# Patient Record
Sex: MALE | Race: WHITE | NOT HISPANIC OR LATINO | Employment: OTHER | URBAN - METROPOLITAN AREA
[De-identification: names, ages, dates, MRNs, and addresses within clinical notes are randomized per-mention and may not be internally consistent; named-entity substitution may affect disease eponyms.]

---

## 2020-09-24 ENCOUNTER — HOSPITAL ENCOUNTER (OUTPATIENT)
Dept: RADIOLOGY | Facility: HOSPITAL | Age: 85
Discharge: HOME | End: 2020-09-24
Attending: FAMILY MEDICINE
Payer: MEDICARE

## 2020-09-24 DIAGNOSIS — R41.3 MEMORY LOSS OR IMPAIRMENT: ICD-10-CM

## 2020-09-24 DIAGNOSIS — R32 ENURESIS: ICD-10-CM

## 2020-09-24 PROCEDURE — 70551 MRI BRAIN STEM W/O DYE: CPT | Mod: TC,PO

## 2020-09-24 PROCEDURE — 70551 MRI BRAIN STEM W/O DYE: CPT | Mod: 26,,, | Performed by: RADIOLOGY

## 2020-09-24 PROCEDURE — 70551 MRI BRAIN WITHOUT CONTRAST: ICD-10-PCS | Mod: 26,,, | Performed by: RADIOLOGY

## 2020-11-05 PROBLEM — F32.A ANXIETY AND DEPRESSION: Status: ACTIVE | Noted: 2020-11-05

## 2020-11-05 PROBLEM — F41.9 ANXIETY AND DEPRESSION: Status: ACTIVE | Noted: 2020-11-05

## 2020-11-05 PROBLEM — R32 BOWEL AND BLADDER INCONTINENCE: Status: RESOLVED | Noted: 2020-11-05 | Resolved: 2020-11-05

## 2020-11-05 PROBLEM — R32 BOWEL AND BLADDER INCONTINENCE: Status: ACTIVE | Noted: 2020-11-05

## 2020-11-05 PROBLEM — R15.9 BOWEL AND BLADDER INCONTINENCE: Status: RESOLVED | Noted: 2020-11-05 | Resolved: 2020-11-05

## 2020-11-05 PROBLEM — R15.9 BOWEL AND BLADDER INCONTINENCE: Status: ACTIVE | Noted: 2020-11-05

## 2020-11-05 PROBLEM — F02.80 LATE ONSET ALZHEIMER'S DISEASE WITHOUT BEHAVIORAL DISTURBANCE: Status: ACTIVE | Noted: 2020-11-05

## 2020-11-05 PROBLEM — N39.46 MIXED URGE AND STRESS INCONTINENCE: Status: ACTIVE | Noted: 2020-11-05

## 2020-11-05 PROBLEM — G30.1 LATE ONSET ALZHEIMER'S DISEASE WITHOUT BEHAVIORAL DISTURBANCE: Status: ACTIVE | Noted: 2020-11-05

## 2020-11-18 ENCOUNTER — TELEPHONE (OUTPATIENT)
Dept: UROLOGY | Facility: CLINIC | Age: 85
End: 2020-11-18

## 2020-11-18 NOTE — TELEPHONE ENCOUNTER
----- Message from Amber Brennan sent at 11/18/2020  4:17 PM CST -----  Regarding: sooner appt  Contact: [phone]  Type:  Sooner Apoointment Request    Caller is requesting a sooner appointment.  Caller declined first available appointment listed below.  Caller will not accept being placed on the waitlist and is requesting a message be sent to doctor.  Name of Caller: daughter  When is the first available appointment? 12/14/20  Symptoms:can not use restroom   Would the patient rather a call back or a response via [email or web address]? Call back   Best Call Back Number:[phone]  Additional Information:

## 2020-11-19 ENCOUNTER — TELEPHONE (OUTPATIENT)
Dept: UROLOGY | Facility: CLINIC | Age: 85
End: 2020-11-19

## 2020-11-19 NOTE — TELEPHONE ENCOUNTER
----- Message from Sanam Sood sent at 11/19/2020  9:10 AM CST -----  Contact: daughter  Type:  Patient Returning Call    Who Called:  daughter- sy  Who Left Message for Patient:  Summer oCrrales  Does the patient know what this is regarding?:  yes  Best Call Back Number:  [phone] (home)

## 2020-11-23 ENCOUNTER — HOSPITAL ENCOUNTER (OUTPATIENT)
Dept: RADIOLOGY | Facility: HOSPITAL | Age: 85
Discharge: HOME | End: 2020-11-23
Attending: UROLOGY
Payer: MEDICARE

## 2020-11-23 ENCOUNTER — OFFICE VISIT (OUTPATIENT)
Dept: UROLOGY | Facility: CLINIC | Age: 85
End: 2020-11-23
Payer: MEDICARE

## 2020-11-23 VITALS — WEIGHT: 156.06 LBS | HEIGHT: 67 IN | BODY MASS INDEX: 24.49 KG/M2

## 2020-11-23 DIAGNOSIS — Z87.442 HISTORY OF KIDNEY STONES: Primary | ICD-10-CM

## 2020-11-23 DIAGNOSIS — R31.9 HEMATURIA, UNSPECIFIED TYPE: ICD-10-CM

## 2020-11-23 DIAGNOSIS — R39.15 URINARY URGENCY: ICD-10-CM

## 2020-11-23 DIAGNOSIS — N39.41 URGE INCONTINENCE: ICD-10-CM

## 2020-11-23 DIAGNOSIS — R35.0 INCREASED URINARY FREQUENCY: ICD-10-CM

## 2020-11-23 DIAGNOSIS — R31.29 MICROSCOPIC HEMATURIA: ICD-10-CM

## 2020-11-23 LAB
BILIRUB SERPL-MCNC: ABNORMAL MG/DL
BLOOD URINE, POC: ABNORMAL
CLARITY, POC UA: CLEAR
COLOR, POC UA: YELLOW
GLUCOSE UR QL STRIP: ABNORMAL
KETONES UR QL STRIP: ABNORMAL
LEUKOCYTE ESTERASE URINE, POC: ABNORMAL
NITRITE, POC UA: ABNORMAL
PH, POC UA: 5.5
PROTEIN, POC: ABNORMAL
SPECIFIC GRAVITY, POC UA: >=1.03
UROBILINOGEN, POC UA: 0.2

## 2020-11-23 PROCEDURE — 99204 OFFICE O/P NEW MOD 45 MIN: CPT | Mod: S$PBB,,, | Performed by: UROLOGY

## 2020-11-23 PROCEDURE — 99204 PR OFFICE/OUTPT VISIT, NEW, LEVL IV, 45-59 MIN: ICD-10-PCS | Mod: S$PBB,,, | Performed by: UROLOGY

## 2020-11-23 PROCEDURE — 99999 PR PBB SHADOW E&M-EST. PATIENT-LVL III: ICD-10-PCS | Mod: PBBFAC,,, | Performed by: UROLOGY

## 2020-11-23 PROCEDURE — 99999 PR PBB SHADOW E&M-EST. PATIENT-LVL III: CPT | Mod: PBBFAC,,, | Performed by: UROLOGY

## 2020-11-23 PROCEDURE — 81002 URINALYSIS NONAUTO W/O SCOPE: CPT | Mod: PBBFAC,PO | Performed by: UROLOGY

## 2020-11-23 PROCEDURE — 99213 OFFICE O/P EST LOW 20 MIN: CPT | Mod: PBBFAC,25,PO | Performed by: UROLOGY

## 2020-11-23 PROCEDURE — 74176 CT ABD & PELVIS W/O CONTRAST: CPT | Mod: 26,,, | Performed by: RADIOLOGY

## 2020-11-23 PROCEDURE — 74176 CT RENAL STONE STUDY ABD PELVIS WO: ICD-10-PCS | Mod: 26,,, | Performed by: RADIOLOGY

## 2020-11-23 PROCEDURE — 74176 CT ABD & PELVIS W/O CONTRAST: CPT | Mod: TC,PO

## 2020-11-23 RX ORDER — CITALOPRAM 20 MG/1
TABLET, FILM COATED ORAL
COMMUNITY
Start: 2020-11-17 | End: 2021-02-02 | Stop reason: SDUPTHER

## 2020-11-23 RX ORDER — MIRABEGRON 50 MG/1
1 TABLET, FILM COATED, EXTENDED RELEASE ORAL DAILY
Qty: 30 TABLET | Refills: 11 | Status: SHIPPED | OUTPATIENT
Start: 2020-11-23 | End: 2021-03-15 | Stop reason: ALTCHOICE

## 2020-11-23 NOTE — LETTER
November 23, 2020      Aniceto Lester MD  [address] [facility] [address]  [facility] [address]           [facility]  [address] [place] [address]  Phone: [phone]  Fax: [phone]          Patient: Jared Rivera   MR Number: [id number]   YOB: 1935   Date of Visit: 11/23/2020       Dear Dr. Aniceto Lester:    Thank you for referring Jared Rivera to me for evaluation. Attached you will find relevant portions of my assessment and plan of care.    If you have questions, please do not hesitate to call me. I look forward to following Jared Rivera along with you.    Sincerely,    GUNNAR Nelson MD    Enclosure  CC:  No Recipients    If you would like to receive this communication electronically, please contact [email or web address] or [phone] to request more information on [employer] Link access.    For providers and/or their staff who would like to refer a patient to [facility], please contact us through our one-stop-shop provider referral line, [facility], at [phone].    If you feel you have received this communication in error or would no longer like to receive these types of communications, please e-mail [email or web address]

## 2020-11-23 NOTE — PROGRESS NOTES
Subjective:       Patient ID: Jared Rivera is a 85 y.o. male.    Chief Complaint: Other (had issues urinating last tuesday)    HPI     85-year-old with a history of prostate cancer.  He underwent brachytherapy in 2004.  His last PSA is undetectable.  Recently he has been having problem with urinary frequency urgency and urgency incontinence.  He has a history of chronic constipation.  He has been treated with laxative in the past.  He denies hematuria and dysuria.  He is seen today with his daughter who gives a lot of the history.  He does have a history of an obstructing ureteral stone.  A CT scan about 10 months ago showed a large obstructing stone in the proximal right ureter.  He is not having any flank pain.  His urinalysis is clear today and he empties completely.  He was sent down for repeat CT scan.  I reviewed the images with him.  The stone is gone and has likely passed.  He did have a moderate amount of stool in the rectal vault but no significant stool in the colon.  Urine dipstick shows negative for all components except trace blood.  PVR 0 ml    Component PSA Diagnostic   Latest Ref Rng & Units 0.00 - 4.00 ng/mL   9/14/2020 <0.06       Past Medical History:   Diagnosis Date    DVT (deep venous thrombosis)     Hyperlipidemia     Kidney stones     Prostate cancer     prostate     Past Surgical History:   Procedure Laterality Date    HEMORRHOID SURGERY      prostate radiation  implants  2003    WISDOM TOOTH EXTRACTION         Current Outpatient Medications:     bisacodyL (DULCOLAX, BISACODYL,) 5 mg EC tablet, Take 1 tablet (5 mg total) by mouth daily as needed for Constipation., Disp: 30 tablet, Rfl: 1    citalopram (CELEXA) 20 MG tablet, TK 1 T PO QD, Disp: , Rfl:     donepeziL (ARICEPT) 5 MG tablet, Take 5 mg by mouth every evening., Disp: , Rfl:     polyethylene glycol (GLYCOLAX) 17 gram PwPk, Take 17 g by mouth once daily., Disp: , Rfl: 0    rivaroxaban (XARELTO) 20 mg Tab, Take 1  tablet (20 mg total) by mouth daily with dinner or evening meal., Disp: 30 tablet, Rfl: 0    mirabegron (MYRBETRIQ) 50 mg Tb24, Take 1 tablet (50 mg total) by mouth once daily., Disp: 30 tablet, Rfl: 11      Review of Systems   Constitutional: Negative for fever.   Eyes: Negative for visual disturbance.   Respiratory: Negative for shortness of breath.    Cardiovascular: Negative for chest pain.   Gastrointestinal: Positive for constipation. Negative for nausea.   Genitourinary: Positive for frequency and urgency. Negative for dysuria and hematuria.   Musculoskeletal: Negative for gait problem.   Skin: Negative for rash.   Neurological: Negative for seizures.   Psychiatric/Behavioral: Negative for confusion.       Objective:      Physical Exam  Vitals signs reviewed.   Constitutional:       Appearance: He is well-developed.   HENT:      Head: Normocephalic and atraumatic.   Eyes:      Conjunctiva/sclera: Conjunctivae normal.   Cardiovascular:      Rate and Rhythm: Normal rate.   Pulmonary:      Effort: Pulmonary effort is normal.   Abdominal:      General: There is no distension.      Tenderness: There is no abdominal tenderness. There is no right CVA tenderness or left CVA tenderness.   Musculoskeletal: Normal range of motion.      Right lower leg: No edema.      Left lower leg: No edema.   Skin:     General: Skin is warm and dry.      Findings: No rash.   Neurological:      Mental Status: He is alert and oriented to person, place, and time.         Assessment:       1. History of kidney stones    2. Urge incontinence    3. Microscopic hematuria    4. Increased urinary frequency    5. Urinary urgency        Plan:       History of kidney stones    Urge incontinence  -     Ambulatory referral/consult to Urology  -     POCT URINE DIPSTICK WITHOUT MICROSCOPE    Microscopic hematuria  -     Ambulatory referral/consult to Urology  -     CT Renal Stone Study ABD Pelvis WO; Future; Expected date: 11/23/2020    Increased  urinary frequency    Urinary urgency    Other orders  -     mirabegron (MYRBETRIQ) 50 mg Tb24; Take 1 tablet (50 mg total) by mouth once daily.  Dispense: 30 tablet; Refill: 11      I recommend stool softener or antibiotic clear the rectal stool.  Give a trial of Myrbetriq.  If no improvement followup for cystoscopy

## 2020-12-11 ENCOUNTER — TELEPHONE (OUTPATIENT)
Dept: UROLOGY | Facility: CLINIC | Age: 85
End: 2020-12-11

## 2020-12-11 NOTE — TELEPHONE ENCOUNTER
PA done with  and denied due to the patient has not tried and failed any kind of pelvic floor training. LVM for callback from patient's daughter to advise.  Please advise if you want him to try another medication.

## 2020-12-11 NOTE — TELEPHONE ENCOUNTER
----- Message from Lashay Conley sent at 12/11/2020  9:09 AM CST -----  Contact: Daughter  Type: Needs Medical Advice  Who Called:  Yamile  Pharmacy name and phone #:  [facility]  Best Call Back Number: [phone]  Additional Information: the Rx for mirabegron (MYRBETRIQ) 50 mg Tb24 ---cant be filled until the Dr does a PA without insurance the  Rx is over 500 dollars per pharmacist  a request was sent by the pharm already Please advise the daugh is asking for a call back today asap

## 2020-12-14 ENCOUNTER — TELEPHONE (OUTPATIENT)
Dept: UROLOGY | Facility: CLINIC | Age: 85
End: 2020-12-14

## 2020-12-14 NOTE — TELEPHONE ENCOUNTER
----- Message from nAna Benz sent at 12/14/2020 11:23 AM CST -----  Name Of Caller: Yamile Theodoreaster     Provider Name: GUNNAR Nelson    Does patient feel the need to be seen today? No     Relationship to the Pt?: daughter     Contact Preference?: [phone]    What is the nature of the call?: Yamile called and said this is like the forth message she sent regarding medication mirabegron (MYRBETRIQ) 50 mg Tb24 need authorization for the refill        [id number] [address] [facility] [address]     Phone: [phone]    Please call back Yamile as soon as its done thank you

## 2020-12-14 NOTE — TELEPHONE ENCOUNTER
LVM for callback from patient's daughter , contacted [facility] and advised of PA denial.     Did you want to prescribe any other medication for the patient? Please advise

## 2021-10-04 ENCOUNTER — OFFICE VISIT (OUTPATIENT)
Dept: UROLOGY | Facility: CLINIC | Age: 86
End: 2021-10-04
Payer: MEDICARE

## 2021-10-04 VITALS — HEIGHT: 65 IN | BODY MASS INDEX: 24.98 KG/M2 | WEIGHT: 149.94 LBS

## 2021-10-04 DIAGNOSIS — R33.9 URINARY RETENTION: Primary | ICD-10-CM

## 2021-10-04 PROCEDURE — 99213 PR OFFICE/OUTPT VISIT, EST, LEVL III, 20-29 MIN: ICD-10-PCS | Mod: S$PBB,,, | Performed by: UROLOGY

## 2021-10-04 PROCEDURE — 99213 OFFICE O/P EST LOW 20 MIN: CPT | Mod: PBBFAC,PO | Performed by: UROLOGY

## 2021-10-04 PROCEDURE — 99999 PR PBB SHADOW E&M-EST. PATIENT-LVL III: CPT | Mod: PBBFAC,,, | Performed by: UROLOGY

## 2021-10-04 PROCEDURE — 99999 PR PBB SHADOW E&M-EST. PATIENT-LVL III: ICD-10-PCS | Mod: PBBFAC,,, | Performed by: UROLOGY

## 2021-10-04 PROCEDURE — 99213 OFFICE O/P EST LOW 20 MIN: CPT | Mod: S$PBB,,, | Performed by: UROLOGY

## 2021-10-04 RX ORDER — PREDNISOLONE ACETATE 10 MG/ML
SUSPENSION/ DROPS OPHTHALMIC
COMMUNITY
Start: 2021-06-03 | End: 2021-12-07

## 2021-10-04 RX ORDER — AMOXICILLIN 500 MG/1
CAPSULE ORAL
COMMUNITY
Start: 2021-04-08 | End: 2021-12-07

## 2021-10-04 RX ORDER — TAMSULOSIN HYDROCHLORIDE 0.4 MG/1
0.4 CAPSULE ORAL DAILY
Qty: 30 CAPSULE | Refills: 0 | Status: SHIPPED | OUTPATIENT
Start: 2021-10-04 | End: 2021-11-01

## 2021-10-04 RX ORDER — TOBRAMYCIN AND DEXAMETHASONE 3; 1 MG/ML; MG/ML
SUSPENSION/ DROPS OPHTHALMIC
COMMUNITY
Start: 2021-05-19 | End: 2021-12-07

## 2021-10-04 RX ORDER — NEOMYCIN SULFATE, POLYMYXIN B SULFATE, AND DEXAMETHASONE 3.5; 10000; 1 MG/G; [USP'U]/G; MG/G
OINTMENT OPHTHALMIC
COMMUNITY
Start: 2021-06-23 | End: 2021-12-07

## 2021-10-04 RX ORDER — TAMSULOSIN HYDROCHLORIDE 0.4 MG/1
0.4 CAPSULE ORAL DAILY
Qty: 30 CAPSULE | Refills: 11 | Status: SHIPPED | OUTPATIENT
Start: 2021-10-04 | End: 2021-11-02 | Stop reason: SDUPTHER

## 2021-10-06 ENCOUNTER — TELEPHONE (OUTPATIENT)
Dept: UROLOGY | Facility: CLINIC | Age: 86
End: 2021-10-06

## 2021-10-11 ENCOUNTER — CLINICAL SUPPORT (OUTPATIENT)
Dept: UROLOGY | Facility: CLINIC | Age: 86
End: 2021-10-11
Payer: MEDICARE

## 2021-10-11 ENCOUNTER — TELEPHONE (OUTPATIENT)
Dept: UROLOGY | Facility: CLINIC | Age: 86
End: 2021-10-11

## 2021-10-11 DIAGNOSIS — R33.9 URINARY RETENTION: Primary | ICD-10-CM

## 2021-11-02 DIAGNOSIS — R33.9 URINARY RETENTION: Primary | ICD-10-CM

## 2021-11-03 RX ORDER — TAMSULOSIN HYDROCHLORIDE 0.4 MG/1
0.4 CAPSULE ORAL DAILY
Qty: 90 CAPSULE | Refills: 3 | Status: SHIPPED | OUTPATIENT
Start: 2021-11-03 | End: 2022-10-11

## 2022-04-11 ENCOUNTER — OFFICE VISIT (OUTPATIENT)
Dept: GASTROENTEROLOGY | Facility: CLINIC | Age: 87
End: 2022-04-11
Payer: MEDICARE

## 2022-04-11 VITALS — BODY MASS INDEX: 24.53 KG/M2 | HEIGHT: 65 IN | WEIGHT: 147.25 LBS

## 2022-04-11 DIAGNOSIS — Z79.01 CURRENT USE OF ANTICOAGULANT THERAPY: ICD-10-CM

## 2022-04-11 DIAGNOSIS — F02.80 LATE ONSET ALZHEIMER'S DEMENTIA WITHOUT BEHAVIORAL DISTURBANCE: ICD-10-CM

## 2022-04-11 DIAGNOSIS — Z87.898 HISTORY OF ABDOMINAL PAIN: ICD-10-CM

## 2022-04-11 DIAGNOSIS — G30.1 LATE ONSET ALZHEIMER'S DEMENTIA WITHOUT BEHAVIORAL DISTURBANCE: ICD-10-CM

## 2022-04-11 DIAGNOSIS — T17.308A CHOKING, INITIAL ENCOUNTER: ICD-10-CM

## 2022-04-11 DIAGNOSIS — K59.09 CHRONIC CONSTIPATION: ICD-10-CM

## 2022-04-11 DIAGNOSIS — R05.9 COUGH: Primary | ICD-10-CM

## 2022-04-11 PROCEDURE — 99214 PR OFFICE/OUTPT VISIT, EST, LEVL IV, 30-39 MIN: ICD-10-PCS | Mod: S$PBB,,, | Performed by: NURSE PRACTITIONER

## 2022-04-11 PROCEDURE — 99213 OFFICE O/P EST LOW 20 MIN: CPT | Mod: PBBFAC,PO | Performed by: NURSE PRACTITIONER

## 2022-04-11 PROCEDURE — 99999 PR PBB SHADOW E&M-EST. PATIENT-LVL III: CPT | Mod: PBBFAC,,, | Performed by: NURSE PRACTITIONER

## 2022-04-11 PROCEDURE — 99999 PR PBB SHADOW E&M-EST. PATIENT-LVL III: ICD-10-PCS | Mod: PBBFAC,,, | Performed by: NURSE PRACTITIONER

## 2022-04-11 PROCEDURE — 99214 OFFICE O/P EST MOD 30 MIN: CPT | Mod: S$PBB,,, | Performed by: NURSE PRACTITIONER

## 2022-04-11 RX ORDER — NEOMYCIN SULFATE, POLYMYXIN B SULFATE, AND DEXAMETHASONE 3.5; 10000; 1 MG/G; [USP'U]/G; MG/G
OINTMENT OPHTHALMIC
COMMUNITY
Start: 2021-06-22

## 2022-04-11 RX ORDER — OMEPRAZOLE 40 MG/1
40 CAPSULE, DELAYED RELEASE ORAL DAILY
Qty: 30 CAPSULE | Refills: 2 | Status: SHIPPED | OUTPATIENT
Start: 2022-04-11 | End: 2022-06-10

## 2022-04-11 NOTE — PROGRESS NOTES
Subjective:       Patient ID: Jared Rivera is a 86 y.o. male, Body mass index is 24.51 kg/m².    Chief Complaint: Cough      Patient is new to me. Referred by Dr. Huitron for dysphagia.     Patient resides at [facility]. Here with daughter, whom provided all information for HPI and ROS.     Gastroesophageal Reflux  He complains of abdominal pain (had one episode of abdominal pain in 1/2022; ED visit unremarkable), choking (denies having the heimlich maneuver performed) and coughing. He reports no belching, no chest pain, no dysphagia, no early satiety, no globus sensation, no heartburn, no hoarse voice, no nausea, no sore throat, no stridor, no tooth decay, no water brash or no wheezing. This is a chronic problem. The current episode started more than 1 month ago (Started several months ago ). The problem occurs occasionally. The problem has been unchanged. Exacerbated by: eating. Pertinent negatives include no anemia, melena or weight loss. Risk factors include lack of exercise and smoking/tobacco exposure (former smoker). He has tried nothing for the symptoms. Past procedures include an EGD (done many years ago; unremarkable per daughter).     Review of Systems   Constitutional: Negative for appetite change, fever, unexpected weight change and weight loss.   HENT: Positive for rhinorrhea (taking Claritin daily helps). Negative for hoarse voice, sore throat and trouble swallowing.    Respiratory: Positive for cough and choking (denies having the heimlich maneuver performed). Negative for shortness of breath and wheezing.    Cardiovascular: Negative for chest pain.   Gastrointestinal: Positive for abdominal pain (had one episode of abdominal pain in 1/2022; ED visit unremarkable) and constipation (chronic problem; well controlled taking Miralax daily). Negative for abdominal distention, anal bleeding, blood in stool, diarrhea, dysphagia, heartburn, melena, nausea, rectal pain and  vomiting.   Genitourinary: Negative for difficulty urinating and dysuria.   Musculoskeletal: Positive for gait problem.   Skin: Negative for rash.   Neurological: Negative for speech difficulty.   Psychiatric/Behavioral: Positive for confusion (patient has dementia).       Past Medical History:   Diagnosis Date    Colon polyp     DVT (deep venous thrombosis)     Hyperlipidemia     Kidney stones     Prostate cancer     prostate      Past Surgical History:   Procedure Laterality Date    COLONOSCOPY  2013    unremarkable per pt report    HEMORRHOID SURGERY      prostate radiation  implants  2003    UPPER GASTROINTESTINAL ENDOSCOPY      WISDOM TOOTH EXTRACTION        Family History   Problem Relation Age of Onset    Cancer Mother     Heart disease Father       Wt Readings from Last 10 Encounters:   04/11/22 66.8 kg (147 lb 4.3 oz)   01/11/22 69.9 kg (154 lb)   12/08/21 68 kg (149 lb 14.6 oz)   10/04/21 68 kg (149 lb 14.6 oz)   10/02/21 68 kg (150 lb)   10/01/21 70.9 kg (156 lb 4.9 oz)   03/15/21 69.9 kg (154 lb)   11/23/20 70.8 kg (156 lb 1.4 oz)   11/17/20 70.8 kg (156 lb 1.6 oz)   10/13/20 66.2 kg (146 lb)     Lab Results   Component Value Date    WBC 6.41 01/04/2022    HGB 13.7 (L) 01/04/2022    HCT 42.1 01/04/2022    MCV 96 01/04/2022     01/04/2022     CMP  Sodium   Date Value Ref Range Status   01/04/2022 137 136 - 145 mmol/L Final     Potassium   Date Value Ref Range Status   01/04/2022 4.2 3.5 - 5.1 mmol/L Final     Comment:     Trace hemolysis     Chloride   Date Value Ref Range Status   01/04/2022 103 95 - 110 mmol/L Final     CO2   Date Value Ref Range Status   01/04/2022 30 22 - 31 mmol/L Final     Glucose   Date Value Ref Range Status   01/04/2022 95 70 - 110 mg/dL Final     Comment:     The ADA recommends the following guidelines for fasting glucose:    Normal:       less than 100 mg/dL    Prediabetes:  100 mg/dL to 125 mg/dL    Diabetes:     126 mg/dL or higher       BUN   Date Value  Ref Range Status   01/04/2022 20 9 - 21 mg/dL Final     Creatinine   Date Value Ref Range Status   01/04/2022 0.97 0.50 - 1.40 mg/dL Final     Calcium   Date Value Ref Range Status   01/04/2022 8.9 8.4 - 10.2 mg/dL Final     Total Protein   Date Value Ref Range Status   01/04/2022 6.2 6.0 - 8.4 g/dL Final     Albumin   Date Value Ref Range Status   01/04/2022 3.6 3.5 - 5.2 g/dL Final     Total Bilirubin   Date Value Ref Range Status   01/04/2022 0.5 0.2 - 1.3 mg/dL Final     Alkaline Phosphatase   Date Value Ref Range Status   01/04/2022 63 38 - 145 U/L Final     AST   Date Value Ref Range Status   01/04/2022 26 17 - 59 U/L Final     ALT   Date Value Ref Range Status   01/04/2022 13 0 - 50 U/L Final     Anion Gap   Date Value Ref Range Status   01/04/2022 4 (L) 8 - 16 mmol/L Final     eGFR if    Date Value Ref Range Status   01/04/2022 >60 >60 mL/min/1.73 m^2 Final     eGFR if non    Date Value Ref Range Status   01/04/2022 >60 >60 mL/min/1.73 m^2 Final     Comment:     Calculation used to obtain the estimated glomerular filtration  rate (eGFR) is the CKD-EPI equation.          Lab Results   Component Value Date    LIPASERES 81 01/04/2022             Reviewed prior medical records including radiology report of CT of abdomen and pelvis 10/1/21 & endoscopy history (see surgical history).     Objective:      Physical Exam  Constitutional:       General: He is not in acute distress.     Appearance: He is well-developed.   HENT:      Head: Normocephalic.      Right Ear: Hearing normal.      Left Ear: Hearing normal.      Nose: Nose normal.      Mouth/Throat:      Comments: Pt wearing mask due to COVID concerns  Eyes:      General: Lids are normal.      Conjunctiva/sclera: Conjunctivae normal.      Pupils: Pupils are equal, round, and reactive to light.   Neck:      Trachea: Trachea normal.   Cardiovascular:      Rate and Rhythm: Normal rate and regular rhythm.      Heart sounds: Normal  heart sounds. No murmur heard.  Pulmonary:      Effort: Pulmonary effort is normal. No respiratory distress.      Breath sounds: Normal breath sounds. No stridor. No wheezing.   Abdominal:      General: Bowel sounds are normal. There is no distension.      Palpations: Abdomen is soft. There is no mass.      Tenderness: There is no abdominal tenderness. There is no guarding or rebound.   Musculoskeletal:         General: Normal range of motion.      Cervical back: Normal range of motion.      Comments: Presents in wheelchair   Skin:     General: Skin is warm and dry.      Findings: No rash.      Comments: Non jaundiced   Neurological:      Mental Status: He is alert. He is confused.      Comments: Patient can nod head yes or no to questions asked   Psychiatric:         Speech: Speech normal.         Behavior: Behavior normal. Behavior is cooperative.           Assessment:       1. Cough    2. Choking, initial encounter    3. Chronic constipation    4. History of abdominal pain    5. Current use of anticoagulant therapy    6. Late onset Alzheimer's dementia without behavioral disturbance           Plan:   All diagnoses and orders for this visit:    Cough & Choking, initial encounter  - Start: omeprazole (PRILOSEC) 40 MG capsule; Take 1 capsule (40 mg total) by mouth once daily.  Dispense: 30 capsule; Refill: 2  - FL UPPER GI AIR CONTRAST WITH ESOPHAGRAM; Future; Expected date: 04/11/2022  - Educated patient to eat smaller more frequent meals and to eat slowly and advised to eat a soft diet.  - Discussed EGD but daughter would like to hold off at this time  - Possible modified barium swallow speech if symptoms persist    Chronic constipation  - Recommend daily exercise as tolerated, adequate water intake, and high fiber diet.   - Recommend OTC fiber supplement  - Recommend OTC stool softener   - Continue MiraLax once daily (17g PO) as directed    History of abdominal pain   - Resolved    Current use of anticoagulant  therapy    Late onset Alzheimer's dementia without behavioral disturbance   - Recommend follow-up with PCP/neurology for continued evaluation and management    If no improvement in symptoms or symptoms worsen, call/follow-up at clinic or go to ER

## 2022-04-14 ENCOUNTER — TELEPHONE (OUTPATIENT)
Dept: GASTROENTEROLOGY | Facility: CLINIC | Age: 87
End: 2022-04-14
Payer: MEDICARE

## 2022-04-14 DIAGNOSIS — R05.9 COUGH: Primary | ICD-10-CM

## 2022-04-14 DIAGNOSIS — T17.308A CHOKING, INITIAL ENCOUNTER: ICD-10-CM

## 2022-04-14 NOTE — TELEPHONE ENCOUNTER
"Called pts daughter Yamile arcos informed her of results and recommendations. Pt states "my dad was cooperative, they only cancelled it because he was aspirating". Yamile states she is driving and will call back to schedule when she gets home.   "

## 2022-04-14 NOTE — TELEPHONE ENCOUNTER
----- Message from Mary Marquis sent at 4/14/2022  3:20 PM CDT -----  Contact: Yamile [phone]  Type: Needs Medical Advice  Who Called:  Pts daughter Yamile Mckeon Call Back Number: [phone]    Additional Information: Pt is calling to schedule procedure. Pls call back and advise.

## 2022-04-14 NOTE — TELEPHONE ENCOUNTER
Let patient's daughter know his UGI with esophagram was a limited study due to patient's inability to cooperate. It showed possible irregularity of esophagus and exam terminated early due to patient aspirating. Recommend a modified barium swallow study with speech and EGD to further evaluate esophagus.

## 2022-05-16 ENCOUNTER — TELEPHONE (OUTPATIENT)
Dept: GASTROENTEROLOGY | Facility: CLINIC | Age: 87
End: 2022-05-16
Payer: MEDICARE

## 2022-05-16 NOTE — TELEPHONE ENCOUNTER
----- Message from Brandi Salcido NP sent at 5/16/2022 12:33 PM CDT -----  Let patient know his modified barium swallow speech showed no aspiration but patient is at an increased risk of aspiration due to cognitive impairment. Recommend he follow speech therapy's suggestions and recommendations and continue with scheduled EGD.

## 2022-05-16 NOTE — TELEPHONE ENCOUNTER
Attempted to contact pt and pts daughter. Yamile. Message to return call to clinic left, callback # provided.

## 2022-05-16 NOTE — TELEPHONE ENCOUNTER
"Pts daughter Yamile returned call. Notified Yamile of results and recommendations per Brandi Salcido NP. Yamile wants to know why it says he showed no signs of aspirations when "the speech therapist said he did and I can see that he did", and " is  she referring to the 1st speech therapists suggestions or the 2nd?", Yamile asked what the EGD was and I explained to her, she then stated  "if she says he is not aspirating why does he need the EGD."  "

## 2022-05-16 NOTE — TELEPHONE ENCOUNTER
----- Message from Marvin Caceres sent at 5/16/2022  3:36 PM CDT -----  Contact: Pt daughter  Type:  Patient Returning Call    Who Called: Pt Daughter   Who Left Message for Patient: Rafat  Does the patient know what this is regarding?: Yes  Best Call Back Number:  [phone]    Additional Information: Daughter was returning call they missed stated to please call back when available Thank you  Please Advise- Thank you

## 2022-05-17 NOTE — TELEPHONE ENCOUNTER
"Let patient's daughter know aspiration was noted during the upper GI with esophagram and she should follow both speech therapists suggestions. EGD was recommended to further evaluate esophagus since the Upper GI with esophagram showed "irregularity in the distal esophagus". Let me know if she has any other questions.   "

## 2022-05-18 NOTE — TELEPHONE ENCOUNTER
Called and spoke with Yamile. Advised Yamile of Brandi Salcido NP explanations. Yamile verbalized understanding and appreciation

## 2022-06-13 ENCOUNTER — TELEPHONE (OUTPATIENT)
Dept: SURGERY | Facility: HOSPITAL | Age: 87
End: 2022-06-13
Payer: MEDICARE

## 2022-06-13 NOTE — TELEPHONE ENCOUNTER
Called and spoke with patient's daughter, medication was discussed with her, she verbalized and confirmed medications

## 2022-06-13 NOTE — TELEPHONE ENCOUNTER
Good morning,     Mr. Rivera is currently taking Xarelto 20mg daily. Please contact his daughter, Yamile, should Mr. Rivera need to stop this medication prior to his EGD on Wed. 6/15.     Thank you!

## 2022-06-14 NOTE — H&P
History & Physical - Short Stay  Gastroenterology      SUBJECTIVE:     Procedure: Gastroscopy    Chief Complaint/Indication for Procedure: Dysphagia.  Abnl UGI.    History of Present Illness:  See PCP's recent OV note:  Office Visit   1/11/2022  [facility]-Christophe Huitron MD    Family Medicine  Dysphagia, unspecified type +1 more    Dx  Hospital Follow Up; Referred by Aaareferral Self    Reason for Visit     Progress Notes  Mindy Huitron MD (Physician)   Family Medicine    [facility] Note      Patient ID: Jared Rivera is a 86 y.o. male.     Chief Complaint: Hospital Follow Up        Patient is a 86-year-old male with past medical history of dementia, incontinence who presents today for hospital follow-up.  He was sent by his assisted living to the hospital for concerns for abdominal pain.  He had workup while in the emergency department which was negative at that time.  Daughter states that by the time she got back in the emergency department he patient appeared at baseline.  She states that since hospital visit patient has been at baseline.  She does states that he seems to have more difficulty swallowing lately.  He is coughing and choking on certain foods.  Also states that he does have some rhinorrhea and postnasal drip to which she takes Claritin intermittently for.    Assessment:  1. Dysphagia, unspecified type    2. Seasonal allergies       Plan:      Dysphagia, unspecified type  -     Ambulatory referral/consult to Speech Therapy; Future; Expected date: 01/18/2022  -     Ambulatory referral/consult to Gastroenterology; Future; Expected date: 01/18/2022  - given onset of dysphagia he is referred to speech therapy in Gastroenterology.     Seasonal allergies  -     loratadine (CLARITIN) 10 mg tablet; Take 1 tablet (10 mg total) by mouth once daily.  Dispense: 90 tablet; Refill: 3  - refill given on Claritin for patient to take daily for seasonal allergies.                  And see recent GI note:  Office Visit   4/11/2022  [facility] - G[facility]enterology     Brandi Salcido NP    Gastroenterology  Cough +5 more    Dx  Cough; Referred by Mindy Huitron MD    Reason for Visit     Progress Notes  Brandi Salcido NP (Nurse Practitioner)   Gastroenterology  Subjective:       Patient ID: Jared Rivera is a 86 y.o. male, Body mass index is 24.51 kg/m².     Chief Complaint: Cough        Patient is new to me. Referred by Dr. Huitron for dysphagia.      Patient resides at [facility]. Here with daughter, whom provided all information for HPI and ROS.      Gastroesophageal Reflux  He complains of abdominal pain (had one episode of abdominal pain in 1/2022; ED visit unremarkable), choking (denies having the heimlich maneuver performed) and coughing. He reports no belching, no chest pain, no dysphagia, no early satiety, no globus sensation, no heartburn, no hoarse voice, no nausea, no sore throat, no stridor, no tooth decay, no water brash or no wheezing. This is a chronic problem. The current episode started more than 1 month ago (Started several months ago ). The problem occurs occasionally. The problem has been unchanged. Exacerbated by: eating. Pertinent negatives include no anemia, melena or weight loss. Risk factors include lack of exercise and smoking/tobacco exposure (former smoker). He has tried nothing for the symptoms. Past procedures include an EGD (done many years ago; unremarkable per daughter).      Review of Systems   Constitutional: Negative for appetite change, fever, unexpected weight change and weight loss.   HENT: Positive for rhinorrhea (taking Claritin daily helps). Negative for hoarse voice, sore throat and trouble swallowing.    Respiratory: Positive for cough and choking (denies having the heimlich maneuver performed). Negative for shortness of breath and wheezing.    Cardiovascular: Negative for chest pain.   Gastrointestinal:  Positive for abdominal pain (had one episode of abdominal pain in 1/2022; ED visit unremarkable) and constipation (chronic problem; well controlled taking Miralax daily). Negative for abdominal distention, anal bleeding, blood in stool, diarrhea, dysphagia, heartburn, melena, nausea, rectal pain and vomiting.     Assessment:       1. Cough    2. Choking, initial encounter    3. Chronic constipation    4. History of abdominal pain    5. Current use of anticoagulant therapy    6. Late onset Alzheimer's dementia without behavioral disturbance           Plan:   All diagnoses and orders for this visit:     Cough & Choking, initial encounter  - Start: omeprazole (PRILOSEC) 40 MG capsule; Take 1 capsule (40 mg total) by mouth once daily.  Dispense: 30 capsule; Refill: 2  - FL UPPER GI AIR CONTRAST WITH ESOPHAGRAM; Future; Expected date: 04/11/2022  - Educated patient to eat smaller more frequent meals and to eat slowly and advised to eat a soft diet.  - Discussed EGD but daughter would like to hold off at this time  - Possible modified barium swallow speech if symptoms persist     Chronic constipation  - Recommend daily exercise as tolerated, adequate water intake, and high fiber diet.   - Recommend OTC fiber supplement  - Recommend OTC stool softener   - Continue MiraLax once daily (17g PO) as directed     History of abdominal pain              - Resolved     Current use of anticoagulant therapy     Late onset Alzheimer's dementia without behavioral disturbance              - Recommend follow-up with PCP/neurology for continued evaluation and management                 See Xray  4/14/2022  FL UPPER GI AIR CONTRAST WITH ESOPHAGRAM   CLINICAL HISTORY:  Cough, unspecified  FINDINGS:  This is a very limited quality study.  Patient could not cooperate or follow commands.   There is irregularity of the distal esophagus on the initial study.  Patient aspirated in the exam was terminated.     Impression:   Very limited quality  study due to patient's inability to cooperate.  The exam was terminated early due to patient aspirating.  This was silent aspiration.  Questionable irregularity in the distal esophagus.      Electronically signed by: Balwinder Becerra MD  Date:                                            04/14/2022      See Speech Therapy Eval  5/11/2022:  Progress Notes  Raheel Cosme CCC-SLP (Speech and Language Pathologist)   Speech Pathology                             MODIFIED BARIUM SWALLOW STUDY                                  [facility]     ORAL PHASE:  There was adequate lip closure with no evidence of anterior bolus loss.  Bolus prep was extended.  There was evidence of repetitive lingual motion, delaying initiation and transport up to 10 seconds. There was evidence of the bolus spilling to the pyriforms with nectar thick liquid, pudding and cookie consistencies.  There was spilling of the bolus to the posterior surface of the epiglottis with thin liquid.   I     PHARYNGEAL PHASE:  Soft palate function was determined to be within functional limits.  Anterior hyoid excursion and epiglottis inversion were partial.  Laryngeal elevation was complete. Laryngeal vestibular closure at the height of the swallow was incomplete.  Pharyngeal contraction was complete and tongue base retraction was adequate. There was evidence of the bolus in the laryngeal space prior to the initiation of the swallow.There was no evidence of residue on any of the pharyngeal structures after the swallow. There was evidence of penetration with thin and nectar thick liquid consistencies without evidence of a protective airway cough. There was no aspiration with any consistency. (Penetration-Aspiration Scale 2)     ESOPHAGEAL PHASE: There was no evidence of retention below the UES.      IMPRESSION:  Mild to moderate oral and pharyngeal dysphagia characterized by increased time for oral prep, repetitive lingual motion impacting the timing  of the swallow and laryngeal vestibule unprotected as a result of the bolus spilling prior to initiation of the swallow     RECOMMENDATIONS:  The film of the swallow study was reviewed with Mr. Rivera's daughter.  Education was provided regarding the current function of his swallow. The patient's risk of aspiration are increased as a result of his cognitive impairment.  ST explained this will likely become an ongoing issue and may result in aspiration, especially when the patient is not ambulatory.  Strategies to increase safety of the swallow were discussed and included, supervised meals and cues for slow rate of eating and verbal cues to initiate the swallow. Also suggested was incorporation of nutritional drink supplement if patient demonstrates decreased oral intake. The patient's daughter expressed understanding of suggestions and recommendations. A regular consistency diet and thin liquids are recommended.  Follow up with referring physician is also recommended.                               Raheel Cosme M.S., CCC-SLP  Speech/Language Pathologist            --- Message from Brandi Salcido NP sent at 5/16/2022 12:33 PM CDT ---  Let patient know his modified barium swallow speech showed no aspiration but patient is at an increased risk of aspiration due to cognitive impairment. Recommend he follow speech therapy's suggestions and recommendations and continue with scheduled EGD.          Wt Readings from Last 20 Encounters:   06/13/22 68 kg (150 lb)   04/11/22 66.8 kg (147 lb 4.3 oz)   01/11/22 69.9 kg (154 lb)   12/08/21 68 kg (149 lb 14.6 oz)   10/04/21 68 kg (149 lb 14.6 oz)   10/02/21 68 kg (150 lb)   10/01/21 70.9 kg (156 lb 4.9 oz)   03/15/21 69.9 kg (154 lb)   11/23/20 70.8 kg (156 lb 1.4 oz)   11/17/20 70.8 kg (156 lb 1.6 oz)   10/13/20 66.2 kg (146 lb)   09/14/20 66.7 kg (147 lb)   01/24/20 67.5 kg (148 lb 13 oz)           PTA Medications   Medication Sig    citalopram (CELEXA) 20 MG tablet  Take 1 tablet (20 mg total) by mouth once daily.    donepeziL (ARICEPT) 5 MG tablet Take 1 tablet (5 mg total) by mouth every evening.    loratadine (CLARITIN) 10 mg tablet Take 1 tablet (10 mg total) by mouth once daily.    omeprazole (PRILOSEC) 40 MG capsule TAKE 1 CAPSULE BY MOUTH ONCE DAILY.    tamsulosin (FLOMAX) 0.4 mg Cap Take 1 capsule (0.4 mg total) by mouth once daily.    XARELTO 20 mg Tab TAKE 1 TABLET BY MOUTH ONCE DAILY WITH DINNER.    loperamide (IMODIUM) 2 mg capsule Take 2 mg by mouth 4 (four) times daily as needed for Diarrhea.    neomycin-polymyxin-dexamethasone (DEXACINE) 3.5 mg/g-10,000 unit/g-0.1 % Oint     ondansetron (ZOFRAN ODT) 4 MG TbDL Take 1 tablet (4 mg total) by mouth every 8 (eight) hours as needed.    polyethylene glycol (GLYCOLAX) 17 gram PwPk Take 17 g by mouth once daily.       Review of patient's allergies indicates:  No Known Allergies     Past Medical History:   Diagnosis Date    Alzheimer's disease, unspecified (CODE)     nonverbal    Colon polyp     DVT (deep venous thrombosis)     General anesthetics causing adverse effect in therapeutic use     Hyperlipidemia     Kidney stones     Prostate cancer     prostate     Past Surgical History:   Procedure Laterality Date    CATARACT EXTRACTION Bilateral     COLONOSCOPY  2013    unremarkable per pt report    HEMORRHOID SURGERY      prostate radiation  implants  2003    UPPER GASTROINTESTINAL ENDOSCOPY      WISDOM TOOTH EXTRACTION       Family History   Problem Relation Age of Onset    Cancer Mother     Heart disease Father      Social History     Tobacco Use    Smoking status: Former Smoker    Smokeless tobacco: Never Used   Substance Use Topics    Alcohol use: Not Currently    Drug use: Never         OBJECTIVE:     Vital Signs (Most Recent)  Temp: 98 °F (36.7 °C) (06/15/22 1112)  Pulse: (!) 50 (06/15/22 1112)  Resp: 16 (06/15/22 1112)  BP: 136/65 (06/15/22 1112)  SpO2: 96 % (06/15/22 1112)    Physical  "Exam:  : Ht: 5' 5" (165.1 cm)   Wt: 68 kg (150 lb)   BMI: 24.96 kg/m²                                                         GENERAL:  Comfortable, in no acute distress.                                 HEENT EXAM:  Nonicteric.  No adenopathy.  Oropharynx is clear.               NECK:  Supple.                                                               LUNGS:  Clear.                                                               CARDIAC:  Regular rate and rhythm.  S1, S2.  No murmur.                      ABDOMEN:  Soft, positive bowel sounds, nontender.  No hepatosplenomegaly or masses.  No rebound or guarding.                                             EXTREMITIES:  No edema.     MENTAL STATUS:  Alert and oriented.    ASSESSMENT/PLAN:     Assessment:  Dysphagia.  Abnl UGI.    Plan: Gastroscopy    Anesthesia Plan:   MAC / General Anaesthesia    ASA Grade: ASA 2 - Patient with mild systemic disease with no functional limitations    MALLAMPATI SCORE: I (soft palate, uvula, fauces, and tonsillar pillars visible)    "

## 2022-06-15 ENCOUNTER — ANESTHESIA EVENT (OUTPATIENT)
Dept: ENDOSCOPY | Facility: HOSPITAL | Age: 87
End: 2022-06-15
Payer: MEDICARE

## 2022-06-15 ENCOUNTER — ANESTHESIA (OUTPATIENT)
Dept: ENDOSCOPY | Facility: HOSPITAL | Age: 87
End: 2022-06-15
Payer: MEDICARE

## 2022-06-15 ENCOUNTER — HOSPITAL ENCOUNTER (OUTPATIENT)
Facility: HOSPITAL | Age: 87
Discharge: HOME | End: 2022-06-15
Attending: INTERNAL MEDICINE | Admitting: INTERNAL MEDICINE
Payer: MEDICARE

## 2022-06-15 DIAGNOSIS — R13.10 DYSPHAGIA: ICD-10-CM

## 2022-06-15 PROCEDURE — 43239 EGD BIOPSY SINGLE/MULTIPLE: CPT | Mod: 59,PO | Performed by: INTERNAL MEDICINE

## 2022-06-15 PROCEDURE — 88342 IMHCHEM/IMCYTCHM 1ST ANTB: CPT | Performed by: PATHOLOGY

## 2022-06-15 PROCEDURE — D9220A PRA ANESTHESIA: ICD-10-PCS | Mod: ANES,,, | Performed by: ANESTHESIOLOGY

## 2022-06-15 PROCEDURE — 27201012 HC FORCEPS, HOT/COLD, DISP: Mod: PO | Performed by: INTERNAL MEDICINE

## 2022-06-15 PROCEDURE — D9220A PRA ANESTHESIA: ICD-10-PCS | Mod: CRNA,,, | Performed by: NURSE ANESTHETIST, CERTIFIED REGISTERED

## 2022-06-15 PROCEDURE — 43248 EGD GUIDE WIRE INSERTION: CPT | Mod: PO | Performed by: INTERNAL MEDICINE

## 2022-06-15 PROCEDURE — 88342 IMHCHEM/IMCYTCHM 1ST ANTB: CPT | Mod: 26,,, | Performed by: PATHOLOGY

## 2022-06-15 PROCEDURE — 43239 PR EGD, FLEX, W/BIOPSY, SGL/MULTI: ICD-10-PCS | Mod: 59,,, | Performed by: INTERNAL MEDICINE

## 2022-06-15 PROCEDURE — 63600175 PHARM REV CODE 636 W HCPCS: Mod: PO | Performed by: INTERNAL MEDICINE

## 2022-06-15 PROCEDURE — 88305 TISSUE EXAM BY PATHOLOGIST: CPT | Performed by: PATHOLOGY

## 2022-06-15 PROCEDURE — 43239 EGD BIOPSY SINGLE/MULTIPLE: CPT | Mod: 59,,, | Performed by: INTERNAL MEDICINE

## 2022-06-15 PROCEDURE — 88305 TISSUE EXAM BY PATHOLOGIST: CPT | Mod: 26,,, | Performed by: PATHOLOGY

## 2022-06-15 PROCEDURE — 63600175 PHARM REV CODE 636 W HCPCS: Mod: PO | Performed by: NURSE ANESTHETIST, CERTIFIED REGISTERED

## 2022-06-15 PROCEDURE — 88312 SPECIAL STAINS GROUP 1: CPT | Performed by: PATHOLOGY

## 2022-06-15 PROCEDURE — D9220A PRA ANESTHESIA: Mod: ANES,,, | Performed by: ANESTHESIOLOGY

## 2022-06-15 PROCEDURE — 43248 PR EGD, FLEX, W/DILATION OVER GUIDEWIRE: ICD-10-PCS | Mod: ,,, | Performed by: INTERNAL MEDICINE

## 2022-06-15 PROCEDURE — 88312 SPECIAL STAINS GROUP 1: CPT | Mod: 26,,, | Performed by: PATHOLOGY

## 2022-06-15 PROCEDURE — 88312 PR  SPECIAL STAINS,GROUP I: ICD-10-PCS | Mod: 26,,, | Performed by: PATHOLOGY

## 2022-06-15 PROCEDURE — 88305 TISSUE EXAM BY PATHOLOGIST: ICD-10-PCS | Mod: 26,,, | Performed by: PATHOLOGY

## 2022-06-15 PROCEDURE — 43248 EGD GUIDE WIRE INSERTION: CPT | Mod: ,,, | Performed by: INTERNAL MEDICINE

## 2022-06-15 PROCEDURE — 37000009 HC ANESTHESIA EA ADD 15 MINS: Mod: PO | Performed by: INTERNAL MEDICINE

## 2022-06-15 PROCEDURE — 88342 CHG IMMUNOCYTOCHEMISTRY: ICD-10-PCS | Mod: 26,,, | Performed by: PATHOLOGY

## 2022-06-15 PROCEDURE — D9220A PRA ANESTHESIA: Mod: CRNA,,, | Performed by: NURSE ANESTHETIST, CERTIFIED REGISTERED

## 2022-06-15 PROCEDURE — 37000008 HC ANESTHESIA 1ST 15 MINUTES: Mod: PO | Performed by: INTERNAL MEDICINE

## 2022-06-15 PROCEDURE — 25000003 PHARM REV CODE 250: Mod: PO | Performed by: NURSE ANESTHETIST, CERTIFIED REGISTERED

## 2022-06-15 RX ORDER — SODIUM CHLORIDE 0.9 % (FLUSH) 0.9 %
10 SYRINGE (ML) INJECTION
Status: DISCONTINUED | OUTPATIENT
Start: 2022-06-15 | End: 2022-06-15 | Stop reason: HOSPADM

## 2022-06-15 RX ORDER — PROPOFOL 10 MG/ML
VIAL (ML) INTRAVENOUS
Status: DISCONTINUED | OUTPATIENT
Start: 2022-06-15 | End: 2022-06-15

## 2022-06-15 RX ORDER — LIDOCAINE HCL/PF 100 MG/5ML
SYRINGE (ML) INTRAVENOUS
Status: DISCONTINUED | OUTPATIENT
Start: 2022-06-15 | End: 2022-06-15

## 2022-06-15 RX ORDER — SODIUM CHLORIDE, SODIUM LACTATE, POTASSIUM CHLORIDE, CALCIUM CHLORIDE 600; 310; 30; 20 MG/100ML; MG/100ML; MG/100ML; MG/100ML
INJECTION, SOLUTION INTRAVENOUS CONTINUOUS
Status: DISCONTINUED | OUTPATIENT
Start: 2022-06-15 | End: 2022-06-15 | Stop reason: HOSPADM

## 2022-06-15 RX ADMIN — PROPOFOL 30 MG: 10 INJECTION, EMULSION INTRAVENOUS at 12:06

## 2022-06-15 RX ADMIN — SODIUM CHLORIDE, SODIUM LACTATE, POTASSIUM CHLORIDE, AND CALCIUM CHLORIDE: .6; .31; .03; .02 INJECTION, SOLUTION INTRAVENOUS at 11:06

## 2022-06-15 RX ADMIN — LIDOCAINE HYDROCHLORIDE 100 MG: 20 INJECTION, SOLUTION INTRAVENOUS at 12:06

## 2022-06-15 NOTE — ANESTHESIA PREPROCEDURE EVALUATION
06/15/2022  Jared Rivera is a 86 y.o., male.      Pre-op Assessment    I have reviewed the Patient Summary Reports.     I have reviewed the Nursing Notes. I have reviewed the NPO Status.   I have reviewed the Medications.     Review of Systems  Anesthesia Hx:  Hx of Anesthetic complications Alzheimer's disease, unspecified (CODE   Social:  Former Smoker    Cardiovascular:   hyperlipidemia    Renal/:   Chronic Renal Disease    Neurological:  Dementia    Psych:   Psychiatric History anxiety depression          Physical Exam  General: Well nourished, Cooperative, Alert and Oriented    Airway:  Mallampati: II / II  Mouth Opening: Normal  TM Distance: 4 - 6 cm  Tongue: Normal    Dental:  Intact    Chest/Lungs:  Clear to auscultation, Normal Respiratory Rate    Heart:  Rate: Normal  Rhythm: Regular Rhythm  Sounds: Normal        Anesthesia Plan  Type of Anesthesia, risks & benefits discussed:    Anesthesia Type: Gen Natural Airway  Intra-op Monitoring Plan: Standard ASA Monitors  Induction:  IV  Informed Consent: Informed consent signed with the Patient and all parties understand the risks and agree with anesthesia plan.  All questions answered.   ASA Score: 3    Ready For Surgery From Anesthesia Perspective.     .

## 2022-06-15 NOTE — PROVATION PATIENT INSTRUCTIONS
Discharge Summary/Instructions after an Endoscopic Procedure  Patient Name: Jared Rivera  Patient MRN: [id number]  Patient YOB: 1935  Wednesday, Shirley 15, 2022  Ryan Bergeron MD  Dear patient,  As a result of recent federal legislation (The Federal Cures Act), you may   receive lab or pathology results from your procedure in your [facility]   account before your physician is able to contact you. Your physician or   their representative will relay the results to you with their   recommendations at their soonest availability.  Thank you,  RESTRICTIONS:  During your procedure today, you received medications for sedation.  These   medications may affect your judgment, balance and coordination.  Therefore,   for 24 hours, you have the following restrictions:   - DO NOT drive a car, operate machinery, make legal/financial decisions,   sign important papers or drink alcohol.    ACTIVITY:  Today: no heavy lifting, straining or running due to procedural   sedation/anesthesia.  The following day: return to full activity including work.  DIET:  Eat and drink normally unless instructed otherwise.     TREATMENT FOR COMMON SIDE EFFECTS:  - Mild abdominal pain, nausea, belching, bloating or excessive gas:  rest,   eat lightly and use a heating pad.  - Sore Throat: treat with throat lozenges and/or gargle with warm salt   water.  - Because air was used during the procedure, expelling large amounts of air   from your rectum or belching is normal.  - If a bowel prep was taken, you may not have a bowel movement for 1-3 days.    This is normal.  SYMPTOMS TO WATCH FOR AND REPORT TO YOUR PHYSICIAN:  1. Abdominal pain or bloating, other than gas cramps.  2. Chest pain.  3. Back pain.  4. Signs of infection such as: chills or fever occurring within 24 hours   after the procedure.  5. Rectal bleeding, which would show as bright red, maroon, or black stools.   (A tablespoon of blood from the rectum is not serious,  especially if   hemorrhoids are present.)  6. Vomiting.  7. Weakness or dizziness.  GO DIRECTLY TO THE NEAREST EMERGENCY ROOM IF YOU HAVE ANY OF THE FOLLOWING:      Difficulty breathing              Chills and/or fever over 101 F   Persistent vomiting and/or vomiting blood   Severe abdominal pain   Severe chest pain   Black, tarry stools   Bleeding- more than one tablespoon   Any other symptom or condition that you feel may need urgent attention  Your doctor recommends these additional instructions:  If any biopsies were taken, your doctors clinic will contact you in 1 to 2   weeks with any results.  Continue your present medications.   Take Prilosec (omeprazole) 40 mg by mouth once a day.   Chew your food well.  For questions, problems or results please call your physician - Ryan Bergeron MD at Work:  [phone].  EMERGENCY PHONE NUMBER: [phone], LAB RESULTS: [phone]  IF A COMPLICATION OR EMERGENCY SITUATION ARISES AND YOU ARE UNABLE TO REACH   YOUR PHYSICIAN - GO DIRECTLY TO THE EMERGENCY ROOM.  ___________________________________________  Nurse Signature  ___________________________________________  Patient/Designated Responsible Party Signature  Ryan Bergeron MD  6/15/2022 12:46:45 PM  This report has been verified and signed electronically.  Dear patient,  As a result of recent federal legislation (The Federal Cures Act), you may   receive lab or pathology results from your procedure in your [facility]   account before your physician is able to contact you. Your physician or   their representative will relay the results to you with their   recommendations at their soonest availability.  Thank you.  PROVATION

## 2022-06-15 NOTE — ANESTHESIA POSTPROCEDURE EVALUATION
Anesthesia Post Evaluation    Patient: Jared Rivera    Procedure(s) Performed: Procedure(s) (LRB):  EGD (ESOPHAGOGASTRODUODENOSCOPY) (N/A)    Final Anesthesia Type: general      Patient location during evaluation: PACU  Patient participation: Yes- Able to Participate  Level of consciousness: awake and alert and oriented  Post-procedure vital signs: reviewed and stable  Pain management: adequate  Airway patency: patent    PONV status at discharge: No PONV  Anesthetic complications: no      Cardiovascular status: blood pressure returned to baseline  Respiratory status: unassisted, spontaneous ventilation and room air  Hydration status: euvolemic  Follow-up not needed.          Vitals Value Taken Time   /65 06/15/22 1235   Temp  06/15/22 1240   Pulse 51 06/15/22 1235   Resp 16 06/15/22 1235   SpO2 97 % 06/15/22 1235         No case tracking events are documented in the log.      Pain/José Miguel Score: José Miguel Score: 6 (6/15/2022 12:35 PM)

## 2022-06-15 NOTE — BRIEF OP NOTE
Discharge Note  Short Stay      SUMMARY     Admit Date: 6/15/2022    Attending Physician: yRan Bergeron Jr., MD     Discharge Physician: Ryan Bergeron Jr., MD    Discharge Date: 6/15/2022 12:48 PM    Final Diagnosis: Coughing [R05.9]  Choking, initial encounter [T17.308A]    Impression:            - Normal oropharynx.                          - Normal cricopharyngeus.                          - Normal esophagus.                          - Benign-appearing esophageal stenosis. Biopsied.                          - Z-line regular, 35 cm from the incisors.                          - Medium-sized hiatal hernia.                          - Gastric mucosal atrophy.                          - Normal stomach. Biopsied.                          - Normal pylorus.                          - Normal examined duodenum.                          - Normal major papilla.                          - No endoscopic esophageal abnormality to explain                          patient's dysphagia. Esophagus dilated. Dilated.   Recommendation:        - Discharge patient to home.                          - Observe patient's clinical course following                          today's procedure with therapeutic intervention.                          - Await pathology results.                          - Continue present medications.                          - Use Prilosec (omeprazole) 40 mg PO daily.   Ryan Bergeron MD   6/15/2022       Disposition: HOME OR SELF CARE    Patient Instructions:   Current Discharge Medication List      CONTINUE these medications which have NOT CHANGED    Details   citalopram (CELEXA) 20 MG tablet Take 1 tablet (20 mg total) by mouth once daily.  Qty: 90 tablet, Refills: 1      donepeziL (ARICEPT) 5 MG tablet Take 1 tablet (5 mg total) by mouth every evening.  Qty: 90 tablet, Refills: 1      loratadine (CLARITIN) 10 mg tablet Take 1 tablet (10 mg total) by mouth once daily.  Qty: 90 tablet, Refills: 3     Associated Diagnoses: Seasonal allergies      omeprazole (PRILOSEC) 40 MG capsule TAKE 1 CAPSULE BY MOUTH ONCE DAILY.  Qty: 30 capsule, Refills: 2    Associated Diagnoses: Cough      tamsulosin (FLOMAX) 0.4 mg Cap Take 1 capsule (0.4 mg total) by mouth once daily.  Qty: 90 capsule, Refills: 3    Associated Diagnoses: Urinary retention      XARELTO 20 mg Tab TAKE 1 TABLET BY MOUTH ONCE DAILY WITH DINNER.  Qty: 90 tablet, Refills: 1      loperamide (IMODIUM) 2 mg capsule Take 2 mg by mouth 4 (four) times daily as needed for Diarrhea.      neomycin-polymyxin-dexamethasone (DEXACINE) 3.5 mg/g-10,000 unit/g-0.1 % Oint       ondansetron (ZOFRAN ODT) 4 MG TbDL Take 1 tablet (4 mg total) by mouth every 8 (eight) hours as needed.  Qty: 15 tablet, Refills: 0      polyethylene glycol (GLYCOLAX) 17 gram PwPk Take 17 g by mouth once daily.  Qty: 90 each, Refills: 3    Associated Diagnoses: Constipation, unspecified constipation type             Discharge Procedure Orders (must include Diet, Follow-up, Activity)    Follow Up:  Follow up with PCP as per your routine.  Please follow a mechanical soft  diet.  Activity as tolerated.    No driving day of procedure.

## 2022-06-15 NOTE — TRANSFER OF CARE
"Anesthesia Transfer of Care Note    Patient: Jared Rivera    Procedure(s) Performed: Procedure(s) (LRB):  EGD (ESOPHAGOGASTRODUODENOSCOPY) (N/A)    Patient location: PACU    Anesthesia Type: general    Transport from OR: Transported from OR on room air with adequate spontaneous ventilation    Post pain: adequate analgesia    Post assessment: no apparent anesthetic complications and tolerated procedure well    Post vital signs: stable    Level of consciousness: awake and alert    Nausea/Vomiting: no nausea/vomiting    Complications: none    Transfer of care protocol was followed      Last vitals:   Visit Vitals  BP (!) 143/65 (BP Location: Left arm, Patient Position: Lying)   Pulse (!) 51   Temp 36.7 °C (98 °F)   Resp 16   Ht 5' 5" (1.651 m)   Wt 68 kg (150 lb)   SpO2 97%   BMI 24.96 kg/m²     "

## 2022-06-16 VITALS
SYSTOLIC BLOOD PRESSURE: 130 MMHG | HEIGHT: 65 IN | WEIGHT: 150 LBS | OXYGEN SATURATION: 96 % | HEART RATE: 58 BPM | BODY MASS INDEX: 24.99 KG/M2 | DIASTOLIC BLOOD PRESSURE: 66 MMHG | RESPIRATION RATE: 18 BRPM | TEMPERATURE: 98 F

## 2022-06-22 LAB
FINAL PATHOLOGIC DIAGNOSIS: NORMAL
Lab: NORMAL

## 2022-07-13 ENCOUNTER — TELEPHONE (OUTPATIENT)
Dept: GASTROENTEROLOGY | Facility: CLINIC | Age: 87
End: 2022-07-13
Payer: MEDICARE

## 2023-01-01 DIAGNOSIS — R33.9 URINARY RETENTION: ICD-10-CM

## 2023-01-01 RX ORDER — TAMSULOSIN HYDROCHLORIDE 0.4 MG/1
CAPSULE ORAL
Qty: 90 CAPSULE | Refills: 3 | Status: SHIPPED | OUTPATIENT
Start: 2023-01-01 | End: 2024-11-27

## 2023-01-01 RX ORDER — TAMSULOSIN HYDROCHLORIDE 0.4 MG/1
CAPSULE ORAL
Qty: 30 CAPSULE | Refills: 0 | Status: SHIPPED | OUTPATIENT
Start: 2023-01-01 | End: 2023-01-01

## 2023-02-28 PROBLEM — R54 FRAILTY: Status: ACTIVE | Noted: 2023-01-01

## 2023-11-29 PROBLEM — I73.9 PERIPHERAL VASCULAR DISEASE, UNSPECIFIED: Status: ACTIVE | Noted: 2023-01-01
